# Patient Record
Sex: FEMALE | Race: WHITE
[De-identification: names, ages, dates, MRNs, and addresses within clinical notes are randomized per-mention and may not be internally consistent; named-entity substitution may affect disease eponyms.]

---

## 2017-03-02 ENCOUNTER — HOSPITAL ENCOUNTER (EMERGENCY)
Dept: HOSPITAL 62 - ER | Age: 57
Discharge: HOME | End: 2017-03-02
Payer: COMMERCIAL

## 2017-03-02 VITALS — DIASTOLIC BLOOD PRESSURE: 77 MMHG | SYSTOLIC BLOOD PRESSURE: 133 MMHG

## 2017-03-02 DIAGNOSIS — Z88.2: ICD-10-CM

## 2017-03-02 DIAGNOSIS — R21: Primary | ICD-10-CM

## 2017-03-02 DIAGNOSIS — R53.83: ICD-10-CM

## 2017-03-02 DIAGNOSIS — Z88.0: ICD-10-CM

## 2017-03-02 DIAGNOSIS — Z88.5: ICD-10-CM

## 2017-03-02 DIAGNOSIS — M54.5: ICD-10-CM

## 2017-03-02 DIAGNOSIS — F41.9: ICD-10-CM

## 2017-03-02 DIAGNOSIS — G89.29: ICD-10-CM

## 2017-03-02 DIAGNOSIS — R00.0: ICD-10-CM

## 2017-03-02 DIAGNOSIS — Z88.1: ICD-10-CM

## 2017-03-02 DIAGNOSIS — Z79.899: ICD-10-CM

## 2017-03-02 LAB
ALBUMIN SERPL-MCNC: 4.8 G/DL (ref 3.5–5)
ALP SERPL-CCNC: 97 U/L (ref 38–126)
ALT SERPL-CCNC: 42 U/L (ref 9–52)
ANION GAP SERPL CALC-SCNC: 16 MMOL/L (ref 5–19)
AST SERPL-CCNC: 27 U/L (ref 14–36)
BASOPHILS # BLD AUTO: 0.1 10^3/UL (ref 0–0.2)
BASOPHILS NFR BLD AUTO: 0.9 % (ref 0–2)
BILIRUB DIRECT SERPL-MCNC: 0 MG/DL (ref 0–0.3)
BILIRUB SERPL-MCNC: 0.5 MG/DL (ref 0.2–1.3)
BUN SERPL-MCNC: 12 MG/DL (ref 7–20)
CALCIUM: 9.7 MG/DL (ref 8.4–10.2)
CHLORIDE SERPL-SCNC: 104 MMOL/L (ref 98–107)
CO2 SERPL-SCNC: 22 MMOL/L (ref 22–30)
CREAT SERPL-MCNC: 0.7 MG/DL (ref 0.52–1.25)
EOSINOPHIL # BLD AUTO: 0.1 10^3/UL (ref 0–0.6)
EOSINOPHIL NFR BLD AUTO: 2.5 % (ref 0–6)
ERYTHROCYTE [DISTWIDTH] IN BLOOD BY AUTOMATED COUNT: 13.7 % (ref 11.5–14)
GLUCOSE SERPL-MCNC: 126 MG/DL (ref 75–110)
HCT VFR BLD CALC: 42.3 % (ref 36–47)
HGB BLD-MCNC: 14.1 G/DL (ref 12–15.5)
HGB HCT DIFFERENCE: 0
LYMPHOCYTES # BLD AUTO: 2.1 10^3/UL (ref 0.5–4.7)
LYMPHOCYTES NFR BLD AUTO: 37.3 % (ref 13–45)
MCH RBC QN AUTO: 29.6 PG (ref 27–33.4)
MCHC RBC AUTO-ENTMCNC: 33.2 G/DL (ref 32–36)
MCV RBC AUTO: 89 FL (ref 80–97)
MONOCYTES # BLD AUTO: 0.5 10^3/UL (ref 0.1–1.4)
MONOCYTES NFR BLD AUTO: 7.8 % (ref 3–13)
NEUTROPHILS # BLD AUTO: 3 10^3/UL (ref 1.7–8.2)
NEUTS SEG NFR BLD AUTO: 51.5 % (ref 42–78)
POTASSIUM SERPL-SCNC: 3.7 MMOL/L (ref 3.6–5)
PROT SERPL-MCNC: 8.1 G/DL (ref 6.3–8.2)
RBC # BLD AUTO: 4.75 10^6/UL (ref 3.72–5.28)
SODIUM SERPL-SCNC: 142 MMOL/L (ref 137–145)
WBC # BLD AUTO: 5.8 10^3/UL (ref 4–10.5)

## 2017-03-02 PROCEDURE — 85025 COMPLETE CBC W/AUTO DIFF WBC: CPT

## 2017-03-02 PROCEDURE — 84484 ASSAY OF TROPONIN QUANT: CPT

## 2017-03-02 PROCEDURE — 93010 ELECTROCARDIOGRAM REPORT: CPT

## 2017-03-02 PROCEDURE — 99283 EMERGENCY DEPT VISIT LOW MDM: CPT

## 2017-03-02 PROCEDURE — 80053 COMPREHEN METABOLIC PANEL: CPT

## 2017-03-02 PROCEDURE — 93005 ELECTROCARDIOGRAM TRACING: CPT

## 2017-03-02 PROCEDURE — 36415 COLL VENOUS BLD VENIPUNCTURE: CPT

## 2017-03-02 NOTE — ER DOCUMENT REPORT
ED General





- General


Chief Complaint: Rash


Stated Complaint: FACIAL NUMBNESS


Mode of Arrival: Wheelchair


Notes: 


Patient is a 56-year-old female with past medical history of chronic pain in 

her low back currently on a multitude of pain control and anxiety medications 

presents today with concerns of a rash on her face and bilateral upper 

extremities that started shortly prior to arrival.  States that she got home 

from the chiropractor's office today she took clonazepam, NyQuil, Lyrica, a 

muscle cream to the low back, and baclofen.  States approximately 30 minutes 

thereafter she developed redness on her face and upper extremities.  She notes 

that it felt like the rash is burning and constant.  She took Benadryl and her 

rash and symptoms have since resolved.  States that she's had similar 

sensations in the past with Lyrica.  At time of my assessment, patient states 

other than feeling tired she has no additional symptoms.  Denies any difficulty 

breathing, chest pain, vomiting, diarrhea or abdominal pain.  She is not seen 

in her primary care doctor regarding today's concerns.


TRAVEL OUTSIDE OF THE [place] IN LAST 30 DAYS: No





- Related Data


Allergies/Adverse Reactions: 


 





ciprofloxacin [From Cipro] Allergy (Verified 03/02/17 18:43)


 


ciprofloxacin HCl [From Cipro] Allergy (Verified 03/02/17 18:43)


 


codeine [Codeine] Allergy (Verified 03/02/17 18:43)


 


Penicillins Allergy (Verified 03/02/17 18:43)


 


Sulfa (Sulfonamide Antibiotics) Allergy (Verified 03/02/17 18:43)


 


sulfamethoxazole [From Bactrim] Allergy (Verified 03/02/17 18:43)


 


tetracycline [Tetracycline] Allergy (Verified 03/02/17 18:43)


 


trimethoprim [From Bactrim] Allergy (Verified 03/02/17 18:43)


 











Past Medical History





- General


Information source: Patient





- Social History


Smoking Status: Never Smoker


Chew tobacco use (# tins/day): No


Frequency of alcohol use: None


Drug Abuse: None


Lives with: Spouse/Significant other


Family History: Reviewed & Not Pertinent


Patient has suicidal ideation: No


Patient has homicidal ideation: No


Neurological Medical History: Reports: Hx Migraine


Renal/ Medical History: Reports: Hx Kidney Stones.  Denies: Hx Peritoneal 

Dialysis


Musculoskeltal Medical History: Reports Hx Arthritis


Psychiatric Medical History: Reports: Hx Depression


Past Surgical History: Reports: Hx Cholecystectomy, Hx Hysterectomy, Hx 

Orthopedic Surgery - carpal tunnel





- Immunizations


Hx Diphtheria, Pertussis, Tetanus Vaccination: Yes





Review of Systems





- Review of Systems


Notes: 


Constitutional: Negative for fever.


HENT: Negative for sore throat.


Eyes: Negative for visual changes.


Cardiovascular: Negative for chest pain.


Respiratory: Negative for shortness of breath.


Gastrointestinal: Negative for abdominal pain, vomiting or diarrhea.


Genitourinary: Negative for dysuria.


Musculoskeletal: Negative for back pain.


Skin: Negative for rash.


Neurological: Negative for headaches, weakness or numbness.





10 point ROS negative except as marked above and in HPI.





Physical Exam





- Vital signs


Vitals: 


 











Temp Pulse Resp BP Pulse Ox


 


 97.4 F   102 H  18   133/77 H  95 


 


 03/02/17 18:36  03/02/17 18:36  03/02/17 18:36  03/02/17 18:36  03/02/17 18:36











Interpretation: Tachycardic


Notes: 


PHYSICAL EXAMINATION:





GENERAL: Well-appearing, well-nourished and in no acute distress.





HEAD: Atraumatic, normocephalic.





EYES: Pupils equal round and reactive to light, extraocular movements intact, 

sclera anicteric, conjunctiva are normal.





ENT: nares patent, oropharynx clear without exudates.  Moist mucous membranes.





NECK: Normal range of motion, supple without lymphadenopathy





LUNGS: Breath sounds clear to auscultation bilaterally and equal.  No wheezes 

rales or rhonchi.





HEART: Regular rate and rhythm without murmurs





ABDOMEN: Soft, nontender, normoactive bowel sounds.  No guarding, no rebound.  

No masses appreciated.





EXTREMITIES: Normal range of motion, no pitting or edema.  No cyanosis.





NEUROLOGICAL: No focal neurological deficits. Moves all extremities 

spontaneously and on command.





PSYCH: Normal mood, normal affect.





SKIN: Warm, Dry, normal turgor, no rashes or lesions noted.





Course





- Re-evaluation


Re-evalutation: 


03/02/17 21:10


Patient presents with multiple vague complaints that did not appear to be 

concerning for any acute life-threatening pathology.  Vitals are within normal 

limits at triage and at time of discharge.  Physical examination is 

unremarkable.  Patient has tolerated oral intake without difficulty.  Patient 

was not noted to be in distress at any point during their ER visit.  Patient 

does describe a rash that occurred after she took a multitude of medications 

including Lyrica, Benadryl, NyQuil, clonazepam, and baclofen all at the same 

time apparently an attempt to control her back pain and help her sleep.  Her 

symptoms have now spontaneously resolved.  No evidence of an allergic reaction 

on assessment without any cutaneous findings rest or distress or GI symptoms.  

Labs obtained in triage are unremarkable.  Will discharge with return 

precautions and follow-up recommendations.  Verbal discharge instructions given 

a the bedside and opportunity for questions given. Medication warnings 

reviewed. Patient is in agreement with this plan and has verbalized 

understanding of return precautions and the need for primary care follow-up in 

the next 24-72 hours.











- Vital Signs


Vital signs: 


 











Temp Pulse Resp BP Pulse Ox


 


 97.4 F   102 H  18   133/77 H  95 


 


 03/02/17 18:36  03/02/17 18:36  03/02/17 18:36  03/02/17 18:36  03/02/17 18:36














- Laboratory


Result Diagrams: 


 03/02/17 18:55





 03/02/17 18:55


Laboratory results interpreted by me: 


 











  03/02/17





  18:55


 


Glucose  126 H














Discharge





- Discharge


Clinical Impression: 


 Rash, Polypharmacy





Condition: Good


Disposition: HOME, SELF-CARE


Additional Instructions: 


Follow-up with your primary care doctor regarding the multitude of medications 

that you take as it seems you are likely taking an inappropriate quantity of 

sedating medications at the same time.  Return if you develop shortness of 

breath, vomiting, chest pain, pass out, or have any other symptoms that are 

worrisome to you.

## 2017-03-02 NOTE — ER DOCUMENT REPORT
ED Medical Screen (RME)





- General


Stated Complaint: FACIAL NUMBNESS


Mode of Arrival: Wheelchair


Information source: Patient


Notes: 


c/o hives to bilateral arms that started approximately 30 minutes. She was at 

the chiropractor earlier today for her chronic back pain, went home and laid 

down on ice pack prior to the hives starting. She endorses difficulty breathing

, difficulty swallowing and facial burning.  


She took baclofen, benadryl, zyrtec, and clonazepam. She states the only new 

medication is lyrica for the past 3 weeks. 





denies chest pain at this time. 





I have greeted and performed a rapid initial assessment of this patient. A 

comprehensive ED assessment and evaluation of the patient, analysis of test 

results and completion of the medical decision making process will be conducted 

by additional ED providers.


TRAVEL OUTSIDE OF THE [place] IN LAST 30 DAYS: No





- Related Data


Allergies/Adverse Reactions: 


 





ciprofloxacin [From Cipro] Allergy (Verified 03/02/17 18:43)


 


ciprofloxacin HCl [From Cipro] Allergy (Verified 03/02/17 18:43)


 


codeine [Codeine] Allergy (Verified 03/02/17 18:43)


 


Penicillins Allergy (Verified 03/02/17 18:43)


 


Sulfa (Sulfonamide Antibiotics) Allergy (Verified 03/02/17 18:43)


 


sulfamethoxazole [From Bactrim] Allergy (Verified 03/02/17 18:43)


 


tetracycline [Tetracycline] Allergy (Verified 03/02/17 18:43)


 


trimethoprim [From Bactrim] Allergy (Verified 03/02/17 18:43)


 











Past Medical History


Neurological Medical History: Reports: Hx Migraine


Renal/ Medical History: Reports: Hx Kidney Stones


Musculoskeltal Medical History: Reports Hx Arthritis


Psychiatric Medical History: Reports: Hx Depression


Past Surgical History: Reports: Hx Cholecystectomy, Hx Hysterectomy, Hx 

Orthopedic Surgery - carpal tunnel





- Immunizations


Hx Diphtheria, Pertussis, Tetanus Vaccination: Yes





Physical Exam





- Vital signs


Vitals: 





 











Temp Pulse Resp BP Pulse Ox


 


 97.4 F   102 H  18   133/77 H  95 


 


 03/02/17 18:36  03/02/17 18:36  03/02/17 18:36  03/02/17 18:36  03/02/17 18:36














- Notes


Notes: 


Anxious appearing, no respiratory distress noted, O2 95% on RA, speaking in 

full sentences but continues to c/o of difficulty breathing.


Oropharynx: clear without edema, malocclusion, uvula midline.


Lungs: equal breath sounds





Course





- Vital Signs


Vital signs: 





 











Temp Pulse Resp BP Pulse Ox


 


 97.4 F   102 H  18   133/77 H  95 


 


 03/02/17 18:36  03/02/17 18:36  03/02/17 18:36  03/02/17 18:36  03/02/17 18:36

## 2017-03-03 NOTE — EKG REPORT
SEVERITY:- BORDERLINE ECG -

SINUS RHYTHM

PROBABLE LEFT ATRIAL ABNORMALITY

POOR R PROGRESSION ANTERIOR LEADS, ?LEAD PLACEMENT ERROR.

:

Confirmed by: Harry Boyd MD 03-Mar-2017 09:08:17

## 2017-05-01 ENCOUNTER — HOSPITAL ENCOUNTER (OUTPATIENT)
Dept: HOSPITAL 62 - RAD | Age: 57
End: 2017-05-01
Attending: FAMILY MEDICINE
Payer: COMMERCIAL

## 2017-05-01 DIAGNOSIS — M51.9: Primary | ICD-10-CM

## 2017-05-01 PROCEDURE — A9577 INJ MULTIHANCE: HCPCS

## 2017-05-01 PROCEDURE — 72158 MRI LUMBAR SPINE W/O & W/DYE: CPT

## 2019-09-09 ENCOUNTER — HOSPITAL ENCOUNTER (OUTPATIENT)
Dept: HOSPITAL 62 - RAD | Age: 59
End: 2019-09-09
Attending: PHYSICIAN ASSISTANT
Payer: COMMERCIAL

## 2019-09-09 DIAGNOSIS — M54.5: Primary | ICD-10-CM

## 2019-09-09 PROCEDURE — 72148 MRI LUMBAR SPINE W/O DYE: CPT

## 2019-09-10 NOTE — RADIOLOGY REPORT (SQ)
EXAM DESCRIPTION:  MRI LUMBAR SPINE WITHOUT



COMPLETED DATE/TIME:  9/9/2019 6:08 pm



REASON FOR STUDY:  M54.5 LOW BACK PAIN M54.5  LOW BACK PAIN



COMPARISON:  5/1/2017



TECHNIQUE:  Sagittal and Axial imaging includes T1, T2, STIR and gradient echo sequences. Coronal T2/
HASTE imaging.



LIMITATIONS:  None.



FINDINGS:  VISUALIZED UPPER ABDOMEN:  Limited evaluation. No acute or suspicious findings suggested.

SEGMENTATION: No transitional anatomy. The lowest well-developed disc space is labeled L5-S1.

ALIGNMENT: Anatomic.

VERTEBRAE: Intact.

BONE MARROW: Normal. No marrow replacement or reactive changes.

DISC SIGNAL: Mild desiccation throughout the lumbar spine most marked at L5-S1.

POSTERIOR ELEMENTS:  Generally intact.  No pars defect evident.

HARDWARE: None in the spine.

CORD AND CONUS: Normal in size and signal intensity. Conus at the appropriate level.

SOFT TISSUES: No aortic aneurysm seen. No bulky retroperitoneal adenopathy or mass. No paraspinal mas
s or fluid.

L1-L2: No significant spinal stenosis or exit foraminal stenosis.

L2-L3: No significant spinal stenosis or exit foraminal stenosis.

L3-L4: No significant spinal stenosis or exit foraminal stenosis.

L4-L5: No significant spinal stenosis or exit foraminal stenosis.

L5-S1: There is disc space narrowing.  The patient has had prior discectomy at L5-S1.  Presumed scar 
surrounding the right S1 nerve root.  Very similar configuration was noted in 2017.  This does result
 in mass effect on the exiting nerve root.  There is stable narrowing of the left neural foramina.

LOWER THORACIC: Incompletely imaged.  No stenosis seen.

SACRUM: Visualized upper sacrum intact.

OTHER: No other significant findings.



IMPRESSION:  Grossly stable MRI of the lumbar spine with bilateral foraminal narrowing at L5-S1 right
 greater than left.



TECHNICAL DOCUMENTATION:  JOB ID:  [id number]

 2011 [employer]- All Rights Reserved



Reading location - IP/workstation name: [email or web address]

## 2019-12-20 ENCOUNTER — HOSPITAL ENCOUNTER (EMERGENCY)
Dept: HOSPITAL 62 - ER | Age: 59
Discharge: HOME | End: 2019-12-20
Payer: COMMERCIAL

## 2019-12-20 VITALS — DIASTOLIC BLOOD PRESSURE: 77 MMHG | SYSTOLIC BLOOD PRESSURE: 141 MMHG

## 2019-12-20 DIAGNOSIS — R53.1: ICD-10-CM

## 2019-12-20 DIAGNOSIS — Z88.5: ICD-10-CM

## 2019-12-20 DIAGNOSIS — Z90.710: ICD-10-CM

## 2019-12-20 DIAGNOSIS — N30.01: Primary | ICD-10-CM

## 2019-12-20 DIAGNOSIS — Z88.1: ICD-10-CM

## 2019-12-20 DIAGNOSIS — R53.83: ICD-10-CM

## 2019-12-20 DIAGNOSIS — R10.9: ICD-10-CM

## 2019-12-20 DIAGNOSIS — Z88.0: ICD-10-CM

## 2019-12-20 DIAGNOSIS — Z88.2: ICD-10-CM

## 2019-12-20 DIAGNOSIS — Z87.442: ICD-10-CM

## 2019-12-20 DIAGNOSIS — R10.813: ICD-10-CM

## 2019-12-20 DIAGNOSIS — R10.30: ICD-10-CM

## 2019-12-20 DIAGNOSIS — R11.0: ICD-10-CM

## 2019-12-20 DIAGNOSIS — Z90.49: ICD-10-CM

## 2019-12-20 DIAGNOSIS — Z88.6: ICD-10-CM

## 2019-12-20 LAB
ADD MANUAL DIFF: NO
ALBUMIN SERPL-MCNC: 5 G/DL (ref 3.5–5)
ALP SERPL-CCNC: 52 U/L (ref 38–126)
ANION GAP SERPL CALC-SCNC: 11 MMOL/L (ref 5–19)
APPEARANCE UR: (no result)
APTT PPP: YELLOW S
AST SERPL-CCNC: 28 U/L (ref 14–36)
BASOPHILS # BLD AUTO: 0.1 10^3/UL (ref 0–0.2)
BASOPHILS NFR BLD AUTO: 0.9 % (ref 0–2)
BILIRUB DIRECT SERPL-MCNC: 0.2 MG/DL (ref 0–0.4)
BILIRUB SERPL-MCNC: 0.4 MG/DL (ref 0.2–1.3)
BILIRUB UR QL STRIP: NEGATIVE
BUN SERPL-MCNC: 17 MG/DL (ref 7–20)
CALCIUM: 10.5 MG/DL (ref 8.4–10.2)
CHLORIDE SERPL-SCNC: 101 MMOL/L (ref 98–107)
CO2 SERPL-SCNC: 29 MMOL/L (ref 22–30)
EOSINOPHIL # BLD AUTO: 0.2 10^3/UL (ref 0–0.6)
EOSINOPHIL NFR BLD AUTO: 4 % (ref 0–6)
ERYTHROCYTE [DISTWIDTH] IN BLOOD BY AUTOMATED COUNT: 12.6 % (ref 11.5–14)
GLUCOSE SERPL-MCNC: 92 MG/DL (ref 75–110)
GLUCOSE UR STRIP-MCNC: NEGATIVE MG/DL
HCT VFR BLD CALC: 39.6 % (ref 36–47)
HGB BLD-MCNC: 13.5 G/DL (ref 12–15.5)
KETONES UR STRIP-MCNC: NEGATIVE MG/DL
LYMPHOCYTES # BLD AUTO: 2.6 10^3/UL (ref 0.5–4.7)
LYMPHOCYTES NFR BLD AUTO: 42.7 % (ref 13–45)
MCH RBC QN AUTO: 30.8 PG (ref 27–33.4)
MCHC RBC AUTO-ENTMCNC: 34.1 G/DL (ref 32–36)
MCV RBC AUTO: 91 FL (ref 80–97)
MONOCYTES # BLD AUTO: 0.6 10^3/UL (ref 0.1–1.4)
MONOCYTES NFR BLD AUTO: 9.2 % (ref 3–13)
NEUTROPHILS # BLD AUTO: 2.6 10^3/UL (ref 1.7–8.2)
NEUTS SEG NFR BLD AUTO: 43.2 % (ref 42–78)
PH UR STRIP: 6 [PH] (ref 5–9)
PLATELET # BLD: 298 10^3/UL (ref 150–450)
POTASSIUM SERPL-SCNC: 4.3 MMOL/L (ref 3.6–5)
PROT SERPL-MCNC: 8.2 G/DL (ref 6.3–8.2)
PROT UR STRIP-MCNC: NEGATIVE MG/DL
RBC # BLD AUTO: 4.38 10^6/UL (ref 3.72–5.28)
SP GR UR STRIP: 1.02
TOTAL CELLS COUNTED % (AUTO): 100 %
UROBILINOGEN UR-MCNC: NEGATIVE MG/DL (ref ?–2)
WBC # BLD AUTO: 6.1 10^3/UL (ref 4–10.5)

## 2019-12-20 PROCEDURE — 96375 TX/PRO/DX INJ NEW DRUG ADDON: CPT

## 2019-12-20 PROCEDURE — 99284 EMERGENCY DEPT VISIT MOD MDM: CPT

## 2019-12-20 PROCEDURE — 81001 URINALYSIS AUTO W/SCOPE: CPT

## 2019-12-20 PROCEDURE — 36415 COLL VENOUS BLD VENIPUNCTURE: CPT

## 2019-12-20 PROCEDURE — 96361 HYDRATE IV INFUSION ADD-ON: CPT

## 2019-12-20 PROCEDURE — 80053 COMPREHEN METABOLIC PANEL: CPT

## 2019-12-20 PROCEDURE — 96376 TX/PRO/DX INJ SAME DRUG ADON: CPT

## 2019-12-20 PROCEDURE — 74176 CT ABD & PELVIS W/O CONTRAST: CPT

## 2019-12-20 PROCEDURE — 96374 THER/PROPH/DIAG INJ IV PUSH: CPT

## 2019-12-20 PROCEDURE — 85025 COMPLETE CBC W/AUTO DIFF WBC: CPT

## 2019-12-20 NOTE — ER DOCUMENT REPORT
ED General





- General


Chief Complaint: Groin Pain


Stated Complaint: WEAKNESS


Time Seen by Provider: 12/20/19 12:10


Primary Care Provider: 


KASIA OTT PA [NO LOCAL MD] - Follow up in 3-5 days


NAYELI DAHL MD [ACTIVE STAFF] - Follow up in 3-5 days


Mode of Arrival: Ambulatory


TRAVEL OUTSIDE OF THE [place] IN LAST 30 DAYS: No





- HPI


Notes: 





59-year-old female to the emergency department with complaints of right flank 

and right groin pain that has been ongoing for the past week.  She states she is

been taking Tylenol and Motrin for the pain but it just is getting worse.  She 

saw her primary care physician and was diagnosed with a urinary tract infection.

 There was blood in her urine and the physician wanted her to come to the 

emergency department for further evaluation for possible stone.  Patient states 

that she decided to come this morning when her pain got worse and she began to 

feel more nauseated.  She was started on Levaquin yesterday and she has taken 1 

dose.  She denies any fevers, chills.  She states that she has had so much pain 

that she has not really been able to sleep at night. She admits to fatigue and 

generalized weakness as well.  Admits to a history of kidney stones but several 

years ago.  She states she can't remember if the pain was like this in the past.

  





- Related Data


Allergies/Adverse Reactions: 


                                        





ciprofloxacin [From Cipro] Allergy (Verified 12/20/19 12:13)


   


ciprofloxacin HCl [From Cipro] Allergy (Verified 12/20/19 12:13)


   


codeine [Codeine] Allergy (Verified 12/20/19 12:13)


   


Penicillins Allergy (Verified 12/20/19 12:13)


   


Sulfa (Sulfonamide Antibiotics) Allergy (Verified 12/20/19 12:13)


   


sulfamethoxazole [From Bactrim] Allergy (Verified 12/20/19 12:13)


   


tetracycline [Tetracycline] Allergy (Verified 12/20/19 12:13)


   


trimethoprim [From Bactrim] Allergy (Verified 12/20/19 12:13)


   








Home Medications: Abx





Past Medical History





- General


Information source: Patient





- Social History


Smoking Status: Never Smoker


Frequency of alcohol use: None


Drug Abuse: None


Family History: Reviewed & Not Pertinent


Patient has suicidal ideation: No


Patient has homicidal ideation: No


Neurological Medical History: Reports: Hx Migraine


Renal/ Medical History: Reports: Hx Kidney Stones.  Denies: Hx Peritoneal 

Dialysis


Musculoskeletal Medical History: Reports Hx Arthritis


Psychiatric Medical History: Reports: Hx Depression


Past Surgical History: Reports: Hx Cholecystectomy, Hx Hysterectomy, Hx 

Orthopedic Surgery - carpal tunnel





- Immunizations


Hx Diphtheria, Pertussis, Tetanus Vaccination: Yes





Review of Systems





- Review of Systems


Constitutional: denies: Chills, Fever


EENT: No symptoms reported


Cardiovascular: denies: Chest pain, Palpitations, Orthopnea, Dyspnea, Syncope, 

Dizziness, Lightheaded


Respiratory: denies: Cough, Short of breath


Gastrointestinal: Abdominal pain, Nausea.  denies: Diarrhea, Vomiting


Genitourinary: Flank pain, Hematuria


Female Genitourinary: No symptoms reported


Musculoskeletal: No symptoms reported


Skin: No symptoms reported


Hematologic/Lymphatic: No symptoms reported


Neurological/Psychological: No symptoms reported


-: Yes All other systems reviewed and negative





Physical Exam





- Vital signs


Vitals: 


                                        











Temp Pulse Resp BP Pulse Ox


 


 97.7 F   84   18   127/72 H  97 


 


 12/20/19 11:54  12/20/19 11:54  12/20/19 11:54  12/20/19 11:54  12/20/19 11:54











Interpretation: Normal





- General


General appearance: Appears well, Alert


In distress: None





- HEENT


Head: Normocephalic, Atraumatic


Eyes: Normal


Pupils: PERRL


Ears: Normal


External canal: Normal


Tympanic membrane: Normal


Sinus: Normal


Nasal: Normal


Mouth/Lips: Normal


Pharynx: Normal.  No: Potential airway comprom.


Neck: Normal, Supple.  No: Lymphadenopathy, Meningismus





- Respiratory


Respiratory status: No respiratory distress


Chest status: Nontender


Breath sounds: Normal.  No: Rales, Rhonchi, Stridor, Wheezing


Chest palpation: Normal





- Cardiovascular


Rhythm: Regular


Heart sounds: Normal auscultation


Murmur: No





- Abdominal


Inspection: Normal


Distension: No distension


Bowel sounds: Normal


Tenderness: Tender.  No: McBurney's point, Dupree's sign - + TTP over the right 

lower quadrant and right CVA.  -- patient appears uncomfortable and has to move 

around in the room to get comfortable.


Organomegaly: No organomegaly





- Back


Back: CVA tenderness - right CVA





- Extremities


General upper extremity: Normal inspection, Nontender, Normal color, Normal ROM,

Normal temperature


General lower extremity: Normal inspection, Nontender, Normal color, Normal ROM,

Normal temperature, Normal weight bearing





- Neurological


Neuro grossly intact: Yes


Cognition: Normal


Orientation: AAOx4


Bill Coma Scale Eye Opening: Spontaneous


[place] Coma Scale Verbal: Oriented


Bill Coma Scale Motor: Obeys Commands


[place] Coma Scale Total: 15


Speech: Normal


Cranial nerves: Normal


Cerebellar coordination: Normal


Motor strength normal: LUE, RUE, LLE, RLE


Additional motor exam normals: Equal 


Sensory: Normal





- Psychological


Associated symptoms: Normal affect, Normal mood





- Skin


Skin Temperature: Warm


Skin Moisture: Dry


Skin Color: Normal





Course





- Re-evaluation


Re-evalutation: 





12/20/19 


Noted CT and lab work which is reassuring.  Urinalysis definitely shows a 

urinary tract infection.  She was started on Levaquin but 250 a day yesterday.  

We will have her take 500 every day for the next 7 days.  She has had good pain 

control and nausea control.  She has had a good p.o. challenge and tolerated it.

 She states that she is feeling much better.  Repeat abdominal exam shows 

improved tenderness to palpation to the right lower quadrant into the right 

flank.  Plan for discharge home.  Gave strict return precautions if patient has 

intractable nausea vomiting, worsening abdominal pain, chest pain, shortness of 

breath or any other complaints.  She agrees with the plan PCP follow-up








- Vital Signs


Vital signs: 


                                        











Temp Pulse Resp BP Pulse Ox


 


 97.6 F   80   18   141/77 H  92 


 


 12/20/19 17:57  12/20/19 17:57  12/20/19 12:10  12/20/19 17:57  12/20/19 17:57














- Laboratory


Result Diagrams: 


                                 12/20/19 12:50





                                 12/20/19 12:50


Laboratory results interpreted by me: 


                                        











  12/20/19 12/20/19





  12:50 14:27


 


Calcium  10.5 H 


 


Urine Blood   SMALL H


 


Leukocyte Esterase Rfl   LARGE H














- Diagnostic Test


Radiology reviewed: Image reviewed, Reports reviewed





Discharge





- Discharge


Clinical Impression: 


 Right groin pain





UTI (urinary tract infection)


Qualifiers:


 Urinary tract infection type: acute cystitis Hematuria presence: with hematuria

Qualified Code(s): N30.01 - Acute cystitis with hematuria





Condition: Stable


Disposition: HOME, SELF-CARE


Instructions:  Urinary Tract Infection ([facility])


Additional Instructions: 


FOLLOW UP WITH PRIMARY CARE AT THE BEGINNING OF NEXT WEEK.  RETURN IF WORSE.  

COMPLETE ANTIBIOTICS.  PUSH FLUIDS. 


Prescriptions: 


Levofloxacin [Levaquin 500 mg Tablet] 500 mg PO DAILY #5 tablet


Hydrocodone/Acetaminophen [Norco 5-325 mg Tablet] 1 tab PO Q4H #12 tablet


Promethazine HCl [Phenergan 25 mg Tablet] 25 mg PO Q6H #15 tablet


Referrals: 


NAYELI DAHL MD [ACTIVE STAFF] - Follow up in 3-5 days


KASIA OTT PA [NO LOCAL MD] - Follow up in 3-5 days

## 2019-12-20 NOTE — RADIOLOGY REPORT (SQ)
EXAM DESCRIPTION:  CT ABD/PELVIS NO ORAL OR IV



COMPLETED DATE/TIME:  12/20/2019 1:10 pm



REASON FOR STUDY:  groin pain, eval stone



COMPARISON:  9/5/2018



TECHNIQUE:  CT scan of the abdomen and pelvis performed without intravenous or oral contrast. Images 
reviewed with lung, soft tissue, and bone windows. Reconstructed coronal and sagittal MPR images revi
ewed. All images stored on PACS.

All CT scanners at this facility use dose modulation, iterative reconstruction, and/or weight based d
osing when appropriate to reduce radiation dose to as low as reasonably achievable (ALARA).

[facility]: Dose Right  [facility]: CareDose    [facility]: Dose Right    [facility]: Teradose 4D    [facility]: Smart Smart Medical Systems



RADIATION DOSE:  CT Rad equipment meets quality standard of care and radiation dose reduction techniq
ues were employed. CTDIvol: 6.9 mGy. DLP: 370 mGy-cm.mGy.



LIMITATIONS:  None.



FINDINGS:  LOWER CHEST: No significant findings. No nodules or infiltrates.

NON-CONTRASTED LIVER, SPLEEN, ADRENALS: Evaluation limited by lack of IV contrast. No identified sign
ificant masses.

PANCREAS: No masses. No peripancreatic inflammatory changes.

GALLBLADDER: Surgically absent.

RIGHT KIDNEY AND URETER: No solid masses. No significant calcification. No hydronephrosis or hydroure
ter.

LEFT KIDNEY AND URETER: No solid masses. No significant calcification. No hydronephrosis or hydrouret
er.

AORTA AND RETROPERITONEUM: No aneurysm. No retroperitoneal masses or adenopathy.  Scattered calcific 
atherosclerosis.

BOWEL AND PERITONEAL CAVITY: No obvious masses or inflammatory changes. No free fluid.

APPENDIX: Surgically absent.

PELVIS, BLADDER, AND ABDOMINAL WALL:No abnormal masses.  Status post hysterectomy.  No free fluid. Bl
adder normal.

BONES: No significant findings.

OTHER: No other significant finding.



IMPRESSION:  1. No noncontrast CT findings of the abdomen or pelvis to explain pain.  No evidence of 
urinary tract calculus or hydronephrosis.

2.  Status post cholecystectomy, appendectomy, and hysterectomy.



TECHNICAL DOCUMENTATION:  JOB ID:  [id number]

Quality ID # [id number]: Final reports with documentation of one or more dose reduction techniques (e.g., Au
tomated exposure control, adjustment of the mA and/or kV according to patient size, use of iterative 
reconstruction technique)

 2011 [employer]- All Rights Reserved



Reading location - IP/workstation name: [email or web address]

## 2019-12-20 NOTE — ER DOCUMENT REPORT
ED Medical Screen (RME)





- General


Chief Complaint: Groin Pain


Stated Complaint: WEAKNESS


Time Seen by Provider: 12/20/19 12:10


Primary Care Provider: 


KASIA OTT PA [Primary Care Provider] - Follow up as needed


Mode of Arrival: Ambulatory


Information source: Patient


Notes: 





59-year-old female presented to ED for right groin pain times a week.  She 

states she went to [facility] and saw Dr. Alvarado this morning he said 

she had a lot of blood and white cells in her urine which could be UTI or 

stones.  He did start on antibiotics.  She states Dr. Alvarado wanted to come to 

the emergency room this morning but she wanted to try the antibiotics but when 

she took the antibiotic she threw up so she is now at the emergency room for 

evaluation and treatment.  She is alert oriented respirations regular nonlabored

speaking in full sentences walks with even steady gait.  Temp is 97 7 at this 

time.

















I have greeted and performed a rapid initial assessment of this patient.  A 

comprehensive ED assessment and evaluation of the patient, analysis of test 

results and completion of medical decision making process will be conducted by 

an additional ED providers.


TRAVEL OUTSIDE OF THE [place] IN LAST 30 DAYS: No





- Related Data


Allergies/Adverse Reactions: 


                                        





ciprofloxacin [From Cipro] Allergy (Verified 03/02/17 18:43)


   


ciprofloxacin HCl [From Cipro] Allergy (Verified 03/02/17 18:43)


   


codeine [Codeine] Allergy (Verified 03/02/17 18:43)


   


Penicillins Allergy (Verified 03/02/17 18:43)


   


Sulfa (Sulfonamide Antibiotics) Allergy (Verified 03/02/17 18:43)


   


sulfamethoxazole [From Bactrim] Allergy (Verified 03/02/17 18:43)


   


tetracycline [Tetracycline] Allergy (Verified 03/02/17 18:43)


   


trimethoprim [From Bactrim] Allergy (Verified 03/02/17 18:43)


   











Past Medical History


Neurological Medical History: Reports: Hx Migraine


Renal/ Medical History: Reports: Hx Kidney Stones.  Denies: Hx Peritoneal 

Dialysis


Musculoskeltal Medical History: Reports Hx Arthritis


Psychiatric Medical History: Reports: Hx Depression


Past Surgical History: Reports: Hx Cholecystectomy, Hx Hysterectomy, Hx 

Orthopedic Surgery - carpal tunnel





- Immunizations


Hx Diphtheria, Pertussis, Tetanus Vaccination: Yes





Physical Exam





- Vital signs


Vitals: 





                                        











Temp Pulse Resp BP Pulse Ox


 


 97.7 F   84   18   127/72 H  97 


 


 12/20/19 11:54  12/20/19 11:54  12/20/19 11:54  12/20/19 11:54  12/20/19 11:54














Course





- Vital Signs


Vital signs: 





                                        











Temp Pulse Resp BP Pulse Ox


 


 97.7 F   84   18   127/72 H  97 


 


 12/20/19 11:54  12/20/19 11:54  12/20/19 11:54  12/20/19 11:54  12/20/19 11:54














Doctor's Discharge





- Discharge


Referrals: 


KASIA OTT PA [Primary Care Provider] - Follow up as needed

## 2020-02-02 ENCOUNTER — HOSPITAL ENCOUNTER (OUTPATIENT)
Dept: HOSPITAL 62 - RAD | Age: 60
End: 2020-02-02
Attending: PHYSICIAN ASSISTANT
Payer: COMMERCIAL

## 2020-02-02 DIAGNOSIS — M25.551: Primary | ICD-10-CM

## 2020-02-02 NOTE — RADIOLOGY REPORT (SQ)
EXAM DESCRIPTION:  MRI RT LOWER JOINT WITHOUT



COMPLETED DATE/TIME:  2/2/2020 10:29 am



REASON FOR STUDY:  M25.551 RIGHT HIP PAIN M25.551  PAIN IN RIGHT HIP



COMPARISON:  None.



TECHNIQUE:  Righthip images acquired and stored on PACS. Multiplanar images to include fat sensitive 
sequences as T1, fluid sensitive sequences as T2/STIR and gradient echo sequences. Large FOV fat and 
fluid sensitive sequences include pelvis and opposite hip.



LIMITATIONS:  None.



FINDINGS:  BONE CORTEX AND MARROW:  No generalized marrow replacement. No occult fracture. No worriso
me bone lesions.

TARGETED HIP:

FEMORAL HEAD: No occult fracture. No osteophytes or subchondral cysts. Normal sphericity of femoral h
ead/neck junction. No acetabular dysplasia. No evidence femoroacetabular impingement. No significant 
effusion.

ACETABULUM: No acetabular dysplasia. No subchondral cysts.

LABRUM: No loss of cartilage or delamination. Labrum is intact. No paralabral cysts.

TROCHANTER: No trochanteric bursal effusion.  No edema/fluid at the insertions of the gluteus medius 
and gluteus minimus.

OPPOSITE HIP: Limited evaluation.  No worrisome bone lesions.  No significant effusion.

PELVIS, LOWER LUMBAR SPINE, SACROILIAC JOINTS:

PELVIS : No insufficiency/stress fractures.  No significant degenerative changes.  Sacroiliac joints 
normal.

L SPINE: No significant osteophytes or degenerative changes of the visualized lumbar spine.

MUSCLES AND SOFT TISSUES: Adductors and piriformis normal. Abductors and greater trochanteric bursa n
ormal without edema or fluid. Iliopsoas bursa without fluid. Hamstring attachments without edema or t
ear.

PELVIC SOFT TISSUES: No masses or adenopathy.

SCIATIC NERVE: Identified, without masses or abnormal signal.

OTHER: No other significant finding.



IMPRESSION:  NO SIGNIFICANT FINDING IN THE HIP.



TECHNICAL DOCUMENTATION:  JOB ID:  [id number]

 2011 [employer]- All Rights Reserved



Reading location - IP/workstation name: [email or web address]

## 2020-08-31 ENCOUNTER — HOSPITAL ENCOUNTER (OUTPATIENT)
Dept: HOSPITAL 62 - RAD | Age: 60
End: 2020-08-31
Attending: PAIN MEDICINE
Payer: COMMERCIAL

## 2020-08-31 DIAGNOSIS — M51.17: Primary | ICD-10-CM

## 2020-08-31 PROCEDURE — 72148 MRI LUMBAR SPINE W/O DYE: CPT

## 2020-08-31 NOTE — RADIOLOGY REPORT (SQ)
EXAM DESCRIPTION:  MRI LUMBAR SPINE WITHOUT



IMAGES COMPLETED DATE/TIME:  8/31/2020 7:58 am



REASON FOR STUDY:  LUMBOSACRAL RADICULOPATHY M54.17  RADICULOPATHY, LUMBOSACRAL REGION



COMPARISON:  None.



TECHNIQUE:  Sagittal and Axial imaging includes T1, T2, STIR and gradient echo sequences. Coronal T2/
HASTE imaging.



LIMITATIONS:  None.



FINDINGS:  VISUALIZED UPPER ABDOMEN:  Limited evaluation. No acute or suspicious findings suggested.

SEGMENTATION: No transitional anatomy. The lowest well-developed disc space is labeled L5-S1.

ALIGNMENT: Anatomic.

VERTEBRAE: Intact.

BONE MARROW: Normal. No marrow replacement or reactive changes.

DISC SIGNAL: Disc disease at the lumbosacral junction, signal and height loss.

POSTERIOR ELEMENTS:  Generally intact.  No pars defect evident.

HARDWARE: None in the spine.

CORD AND CONUS: Normal in size and signal intensity. Conus at the appropriate level.

SOFT TISSUES: No aortic aneurysm seen. No bulky retroperitoneal adenopathy or mass. No paraspinal mas
s or fluid.

L1-L2: No significant spinal stenosis or exit foraminal stenosis.

L2-L3: No significant spinal stenosis or exit foraminal stenosis.

L3-L4: No significant spinal stenosis or exit foraminal stenosis.

L4-L5: No significant spinal stenosis or exit foraminal stenosis.

L5-S1: Right richard laminectomy.  No central stenosis.  Up to moderate foraminal narrowing.

LOWER THORACIC: Incompletely imaged.  No stenosis seen.

SACRUM: Visualized upper sacrum intact.

OTHER: No other significant findings.



IMPRESSION:

1. Postoperative and degenerative changes at the lumbosacral junction.  No high-grade central stenosi
s.



TECHNICAL DOCUMENTATION:  JOB ID:  [id number]

 2011 [employer]- All Rights Reserved



Reading location - IP/workstation name: [email or web address]

## 2020-10-16 ENCOUNTER — HOSPITAL ENCOUNTER (EMERGENCY)
Dept: HOSPITAL 62 - ER | Age: 60
Discharge: HOME | End: 2020-10-16
Payer: COMMERCIAL

## 2020-10-16 VITALS — SYSTOLIC BLOOD PRESSURE: 138 MMHG | DIASTOLIC BLOOD PRESSURE: 83 MMHG

## 2020-10-16 DIAGNOSIS — Z88.1: ICD-10-CM

## 2020-10-16 DIAGNOSIS — Z82.49: ICD-10-CM

## 2020-10-16 DIAGNOSIS — R07.9: Primary | ICD-10-CM

## 2020-10-16 DIAGNOSIS — Z88.5: ICD-10-CM

## 2020-10-16 DIAGNOSIS — Z88.0: ICD-10-CM

## 2020-10-16 DIAGNOSIS — G89.29: ICD-10-CM

## 2020-10-16 DIAGNOSIS — K30: ICD-10-CM

## 2020-10-16 DIAGNOSIS — Z88.6: ICD-10-CM

## 2020-10-16 DIAGNOSIS — Z88.2: ICD-10-CM

## 2020-10-16 LAB
ADD MANUAL DIFF: NO
ALBUMIN SERPL-MCNC: 4.7 G/DL (ref 3.5–5)
ALP SERPL-CCNC: 74 U/L (ref 38–126)
ANION GAP SERPL CALC-SCNC: 9 MMOL/L (ref 5–19)
AST SERPL-CCNC: 23 U/L (ref 14–36)
BASOPHILS # BLD AUTO: 0.1 10^3/UL (ref 0–0.2)
BASOPHILS NFR BLD AUTO: 0.8 % (ref 0–2)
BILIRUB DIRECT SERPL-MCNC: 0.4 MG/DL (ref 0–0.4)
BILIRUB SERPL-MCNC: 0.5 MG/DL (ref 0.2–1.3)
BUN SERPL-MCNC: 12 MG/DL (ref 7–20)
CALCIUM: 10.4 MG/DL (ref 8.4–10.2)
CHLORIDE SERPL-SCNC: 100 MMOL/L (ref 98–107)
CO2 SERPL-SCNC: 27 MMOL/L (ref 22–30)
EOSINOPHIL # BLD AUTO: 0.2 10^3/UL (ref 0–0.6)
EOSINOPHIL NFR BLD AUTO: 2.3 % (ref 0–6)
ERYTHROCYTE [DISTWIDTH] IN BLOOD BY AUTOMATED COUNT: 13.1 % (ref 11.5–14)
GLUCOSE SERPL-MCNC: 80 MG/DL (ref 75–110)
HCT VFR BLD CALC: 38.7 % (ref 36–47)
HGB BLD-MCNC: 13.4 G/DL (ref 12–15.5)
LYMPHOCYTES # BLD AUTO: 2.7 10^3/UL (ref 0.5–4.7)
LYMPHOCYTES NFR BLD AUTO: 39.5 % (ref 13–45)
MCH RBC QN AUTO: 31.4 PG (ref 27–33.4)
MCHC RBC AUTO-ENTMCNC: 34.7 G/DL (ref 32–36)
MCV RBC AUTO: 91 FL (ref 80–97)
MONOCYTES # BLD AUTO: 0.6 10^3/UL (ref 0.1–1.4)
MONOCYTES NFR BLD AUTO: 8.6 % (ref 3–13)
NEUTROPHILS # BLD AUTO: 3.3 10^3/UL (ref 1.7–8.2)
NEUTS SEG NFR BLD AUTO: 48.8 % (ref 42–78)
PLATELET # BLD: 244 10^3/UL (ref 150–450)
POTASSIUM SERPL-SCNC: 4.6 MMOL/L (ref 3.6–5)
PROT SERPL-MCNC: 7.9 G/DL (ref 6.3–8.2)
RBC # BLD AUTO: 4.27 10^6/UL (ref 3.72–5.28)
TOTAL CELLS COUNTED % (AUTO): 100 %
WBC # BLD AUTO: 6.8 10^3/UL (ref 4–10.5)

## 2020-10-16 PROCEDURE — 85025 COMPLETE CBC W/AUTO DIFF WBC: CPT

## 2020-10-16 PROCEDURE — 84484 ASSAY OF TROPONIN QUANT: CPT

## 2020-10-16 PROCEDURE — 71046 X-RAY EXAM CHEST 2 VIEWS: CPT

## 2020-10-16 PROCEDURE — 36415 COLL VENOUS BLD VENIPUNCTURE: CPT

## 2020-10-16 PROCEDURE — 93010 ELECTROCARDIOGRAM REPORT: CPT

## 2020-10-16 PROCEDURE — 93005 ELECTROCARDIOGRAM TRACING: CPT

## 2020-10-16 PROCEDURE — 83735 ASSAY OF MAGNESIUM: CPT

## 2020-10-16 PROCEDURE — 80053 COMPREHEN METABOLIC PANEL: CPT

## 2020-10-16 PROCEDURE — 99285 EMERGENCY DEPT VISIT HI MDM: CPT

## 2020-10-16 NOTE — ER DOCUMENT REPORT
ED Cardiac





- General


Chief Complaint: Chest Pain


Stated Complaint: CHEST PAIN


Time Seen by Provider: 10/16/20 12:45


Primary Care Provider: 


THALIA WILDE MD [NO LOCAL MD] - Follow up as needed


Mode of Arrival: Ambulatory


Information source: Patient


Notes: 





59-year-old female past medical history significant for hyperlipidemia, chronic 

pain presents to the emergency room complaining of sudden onset of left-sided 

chest pain while she was at her pain management office earlier today.  Described

as a sharp stabbing pain that only lasted for a few seconds and went away 

without intervention.  States she did take 4 baby aspirin's but the pain had 

already resolved by the time she took the aspirin.  States she was sent here by 

her pain management office.  She denies any shortness of breath, no nausea, no 

vomiting, no diaphoresis.  Denies any previous cardiac history.  Has a brother 

who developed coronary artery disease at the age of 45.


TRAVEL OUTSIDE OF THE [place] IN LAST 30 DAYS: No





- Related Data


Allergies/Adverse Reactions: 


                                        





codeine [Codeine] Allergy (Verified 12/20/19 12:13)


   


Penicillins Allergy (Verified 12/20/19 12:13)


   


Sulfa (Sulfonamide Antibiotics) Allergy (Verified 12/20/19 12:13)


   


sulfamethoxazole [From Bactrim] Allergy (Verified 12/20/19 12:13)


   


tetracycline [Tetracycline] Allergy (Verified 12/20/19 12:13)


   


trimethoprim [From Bactrim] Allergy (Verified 12/20/19 12:13)


   











Past Medical History





- General


Information source: Patient





- Social History


Smoking Status: Never Smoker


Frequency of alcohol use: None


Drug Abuse: None


Family History: CAD - Brother developed coronary artery disease at the age of 45


Patient has homicidal ideation: No


Neurological Medical History: Reports: Hx Migraine


Renal/ Medical History: Reports: Hx Kidney Stones.  Denies: Hx Peritoneal 

Dialysis


Musculoskeletal Medical History: Reports Hx Arthritis


Psychiatric Medical History: Reports: Hx Depression


Past Surgical History: Reports: Hx Cholecystectomy, Hx Hysterectomy, Hx 

Orthopedic Surgery - carpal tunnel





- Immunizations


Hx Diphtheria, Pertussis, Tetanus Vaccination: Yes





Review of Systems





- Review of Systems


Constitutional: No symptoms reported


EENT: No symptoms reported


Cardiovascular: Chest pain.  denies: Dyspnea, Syncope


Respiratory: No symptoms reported.  denies: Short of breath


Gastrointestinal: No symptoms reported


Musculoskeletal: No symptoms reported


Neurological/Psychological: No symptoms reported


-: Yes All other systems reviewed and negative





Physical Exam





- Vital signs


Vitals: 


                                        











Temp Pulse Resp BP Pulse Ox


 


 98.4 F   88   16   120/93 H  95 


 


 10/16/20 12:15  10/16/20 12:15  10/16/20 12:15  10/16/20 12:15  10/16/20 12:15














- Notes


Notes: 








GENERAL: Mild acute distress, non-toxic appearance.  


HEAD:  Normal with no signs of head trauma.


EYES:  PERRLA, EOMI, conjunctiva normal, no discharge.  


EARS:  Hearing grossly intact.


NOSE: Normal.


THROAT:  Oropharynx is normal. 


NECK:  Normal range of motion, no tenderness, supple, no lymphadenopathy, No 

adenopathy, no JVD.   


CHEST:  Clear breath sounds bilaterally.  No wheezes, rales, or rhonchi.  


CARDIAC:  Regular rate and rhythm.  S1 and S2, without murmurs, gallops, or 

rubs.


VASCULAR:  No Edema.  Peripheral pulses normal and equal in all extremities.


ABDOMEN: Normal and soft with no tenderness, no masses or pulsatile masses.  No 

organomegaly.  Positive bowel sounds x4.  No CVA tenderness noted bilaterally.


GASTROINTESTINAL: Bowel sounds normal


LYMPATHTIC:  No lymphadenopathy noted.


MUSCULOSKELETAL:  Good range of motion of all major joints. Extremities without 

clubbing, cyanosis or edema.  


NEUROLOGICAL:  Alert and oriented x 3.  No focal sensory or strength deficits.  

Speech normal.  Follows commands appropriately.


PSYCHIATRIC:  Normal Affect, judgement and mood.


SKIN:  Normal appearance with no rashes or lesions.





Course





- Re-evaluation


Re-evalutation: 





10/16/20 15:15


HEART Score:





History   0   


ECG   1   


Age   1   


Risk Factors   1


Troponin   0





Total: 3





 If HEART score is less than or equal to 3 AND both tronponin measurments are 

normal, the 30 day risk of a major adverse cardiac event (all-cause mortality, 

myocardia infarction or need for coronary revscularization) is < 1% (Sensitivity

100%, %).








Chest pain in a patient without evidence of cardiac or other serious etiology on

workup today. I discussed with patient that, based on their age, risk factors 

and emergency department testing today, the likelihood that their symptoms are 

related to a heart attack is very low (estimated risk of heart attack or death 

over the next 30 days of less than 1%).  The patient demonstrates decision 

making capacity and has verbalized an understanding of these risks to me. Based 

on this,  the patient has chosen to follow-up as an outpatient. Usual chest pain

return precautions reviewed. The patient states understanding and agreement with

this plan.


10/16/20 1620





Patient is requesting pain medication for her chronic pain.  States she is due 

for her Lyrica.  Patient is also complaining of indigestion after taking the 4 

baby aspirin on an empty stomach.  She is currently denying any chest pain no 

shortness of breath, no difficulty breathing.  We will order her Lyrica and a GI

cocktail.  She is aware that she is waiting for her repeat troponin.  Additional

labs, EKG chest x-ray nonconcerning for acute coronary syndrome.





10/16/20 19:03


Patient continues to deny chest pain.  She is currently asymptomatic and pain-

free.  All test results including EKG, labs, and chest x-ray results were 

reviewed with the patient.  Low risk for acute coronary syndrome.  Negative 

troponin x2.  EKG not concerning for acute STEMI.  Patient was counseled on the 

importance of outpatient follow-up with her primary care physician for further 

evaluation.  Patient was given strict return to the emergency room guidelines.  

Return for any new or worsening symptoms.  All questions were answered.  Patient

verbalized understanding and agrees with plan of care.


10/16/20 19:04





10/16/20 22:26








- Vital Signs


Vital signs: 


                                        











Temp Pulse Resp BP Pulse Ox


 


 98.9 F   88   10 L  138/83 H  94 


 


 10/16/20 19:23  10/16/20 12:15  10/16/20 19:01  10/16/20 19:01  10/16/20 19:01














- Laboratory


Result Diagrams: 


                                 10/16/20 14:55





                                 10/16/20 14:10


Laboratory results interpreted by me: 


                                        











  10/16/20





  14:10


 


Sodium  135.5 L


 


Calcium  10.4 H














- Diagnostic Test


Radiology reviewed: Reports reviewed





- EKG Interpretation by Me


EKG shows normal: Sinus rhythm


When compared to previous EKG there are: No significant change


Additional EKG results interpreted by me: 





10/16/20 15:12


EKG was interpreted by ER physician Dr. Ayala


No acute STEMI


Normal sinus rhythm


Rate 81


Probable left atrial abnormality


No significant change from previous EKG of 3-17





10/16/20 19:01


Repeat EKG


EKG was interpreted by ER physician Dr. Sims


No acute STEMI


Normal sinus rhythm


Rate of 81


Borderline Inferior Q waves


Borderline T wave abnormality


No significant changes when compared to previous EKG of 10/16/2020@1312








Discharge





- Discharge


Clinical Impression: 


 Chest pain of unknown etiology





Condition: Stable


Disposition: HOME, SELF-CARE


Instructions:  Chest Pain of Unclear Cause ([facility])


Additional Instructions: 


Call your primary care physician for an outpatient follow-up appointment.  

Return to emergency room for any new or worsening symptoms.


Referrals: 


THALIA WILDE MD [NO LOCAL MD] - Follow up as needed

## 2020-10-16 NOTE — RADIOLOGY REPORT (SQ)
EXAM DESCRIPTION:  CHEST 2 VIEWS



IMAGES COMPLETED DATE/TIME:  10/16/2020 1:00 pm



REASON FOR STUDY:  Chest pain



COMPARISON:  1/8/2016



EXAM PARAMETERS:  NUMBER OF VIEWS: two views

TECHNIQUE: Digital Frontal and Lateral radiographic views of the chest acquired.

RADIATION DOSE: NA

LIMITATIONS: none



FINDINGS:  LUNGS AND PLEURA: No opacities, masses or pneumothorax. No pleural effusion.

MEDIASTINUM AND HILAR STRUCTURES: No masses or contour abnormalities.

HEART AND VASCULAR STRUCTURES: Heart normal size.  No evidence for failure.

BONES: No acute findings.

HARDWARE: None in the chest.

OTHER: No other significant finding.



IMPRESSION:  NO ACUTE RADIOGRAPHIC FINDING IN THE CHEST.



TECHNICAL DOCUMENTATION:  JOB ID:  [id number]

 2011 [employer]- All Rights Reserved



Reading location - IP/workstation name: [email or web address]

## 2020-10-16 NOTE — EKG REPORT
SEVERITY:- BORDERLINE ECG -

SINUS RHYTHM

PROBABLE LEFT ATRIAL ABNORMALITY

:

Confirmed by: Brittany Cerrato 16-Oct-2020 18:04:54

## 2020-10-16 NOTE — ER DOCUMENT REPORT
ED Medical Screen (RME)





- General


Chief Complaint: Chest Pain


Stated Complaint: CHEST PAIN


Time Seen by Provider: 10/16/20 12:45


Primary Care Provider: 


THALIA WILDE MD [Primary Care Provider] - Follow up as needed


Mode of Arrival: Medic


Information source: Patient


Notes: 





59-year-old female presented to ED for complaint of chest pain.  She states that

she went to her doctor's appointment for pain management and ultrasound that she

had a sharp pain in her chest so she grabbed her chest.  She states they told 

her to take 4 baby aspirins and to come to the emergency room.  She states they 

did send her to the emergency room.  She does have a history of chronic pain.  

She states she also has a history of high cholesterol and her lipidemia.  She 

states her brother did have an MI in his 40s.  She does not smoke drink or use 

any drugs.  She is alert oriented respirations regular nonlabored speaking in 

full sentences.

















I have greeted and performed a rapid initial assessment of this patient.  A 

comprehensive ED assessment and evaluation of the patient, analysis of test 

results and completion of medical decision making process will be conducted by 

an additional ED providers.


TRAVEL OUTSIDE OF THE [place] IN LAST 30 DAYS: No





- Related Data


Allergies/Adverse Reactions: 


                                        





codeine [Codeine] Allergy (Verified 12/20/19 12:13)


   


Penicillins Allergy (Verified 12/20/19 12:13)


   


Sulfa (Sulfonamide Antibiotics) Allergy (Verified 12/20/19 12:13)


   


sulfamethoxazole [From Bactrim] Allergy (Verified 12/20/19 12:13)


   


tetracycline [Tetracycline] Allergy (Verified 12/20/19 12:13)


   


trimethoprim [From Bactrim] Allergy (Verified 12/20/19 12:13)


   











Past Medical History


Neurological Medical History: Reports: Hx Migraine


Renal/ Medical History: Reports: Hx Kidney Stones.  Denies: Hx Peritoneal 

Dialysis


Musculoskeltal Medical History: Reports Hx Arthritis


Psychiatric Medical History: Reports: Hx Depression


Past Surgical History: Reports: Hx Cholecystectomy, Hx Hysterectomy, Hx 

Orthopedic Surgery - carpal tunnel





- Immunizations


Hx Diphtheria, Pertussis, Tetanus Vaccination: Yes





Physical Exam





- Vital signs


Vitals: 





                                        











Temp Pulse Resp BP Pulse Ox


 


 98.4 F   88   16   120/93 H  95 


 


 10/16/20 12:15  10/16/20 12:15  10/16/20 12:15  10/16/20 12:15  10/16/20 12:15














Course





- Vital Signs


Vital signs: 





                                        











Temp Pulse Resp BP Pulse Ox


 


 98.4 F   88   16   120/93 H  95 


 


 10/16/20 12:45  10/16/20 12:15  10/16/20 12:15  10/16/20 12:15  10/16/20 12:15














Doctor's Discharge





- Discharge


Referrals: 


THALIA WILDE MD [Primary Care Provider] - Follow up as needed

## 2020-10-17 NOTE — EKG REPORT
SEVERITY:- BORDERLINE ECG -

SINUS RHYTHM

BORDERLINE INFERIOR Q WAVES

BORDERLINE T ABNORMALITIES, ANT-LAT LEADS

:

Confirmed by: Brittany Cerrato 17-Oct-2020 09:40:27